# Patient Record
Sex: FEMALE | Race: WHITE | NOT HISPANIC OR LATINO | Employment: OTHER | URBAN - METROPOLITAN AREA
[De-identification: names, ages, dates, MRNs, and addresses within clinical notes are randomized per-mention and may not be internally consistent; named-entity substitution may affect disease eponyms.]

---

## 2018-03-30 LAB
25(OH)D3 SERPL-MCNC: 48.15 NG/ML
ALBUMIN SERPL BCP-MCNC: 4 G/DL (ref 3.5–5.7)
ALP SERPL-CCNC: 45 IU/L (ref 55–165)
ALT SERPL W P-5'-P-CCNC: 23 IU/L (ref 10–30)
ANION GAP SERPL CALCULATED.3IONS-SCNC: 10.2 MM/L
AST SERPL W P-5'-P-CCNC: 22 U/L (ref 7–26)
BASOPHILS # BLD AUTO: 0 X3/UL (ref 0–0.3)
BASOPHILS # BLD AUTO: 0.6 % (ref 0–2)
BILIRUB SERPL-MCNC: 0.7 MG/DL (ref 0.3–1)
BUN SERPL-MCNC: 8 MG/DL (ref 7–25)
CALCIUM SERPL-MCNC: 9 MG/DL (ref 8.6–10.5)
CHLORIDE SERPL-SCNC: 106 MM/L (ref 98–107)
CHOLEST SERPL-MCNC: 224 MG/DL (ref 0–200)
CO2 SERPL-SCNC: 29 MM/L (ref 21–31)
CREAT SERPL-MCNC: 0.81 MG/DL (ref 0.6–1.2)
DEPRECATED RDW RBC AUTO: 14 % (ref 11.5–14.5)
EGFR (HISTORICAL): > 60 GFR
EGFR AFRICAN AMERICAN (HISTORICAL): > 60 GFR
EOSINOPHIL # BLD AUTO: 0.1 X3/UL (ref 0–0.5)
EOSINOPHIL NFR BLD AUTO: 2.7 % (ref 0–5)
GLUCOSE (HISTORICAL): 97 MG/DL (ref 65–99)
HCT VFR BLD AUTO: 40.5 % (ref 37–47)
HDLC SERPL-MCNC: 69 MG/DL (ref 40–60)
HGB BLD-MCNC: 13.7 G/DL (ref 12–16)
LDLC SERPL CALC-MCNC: 141.2 MG/DL (ref 75–193)
LYMPHOCYTES # BLD AUTO: 1.7 X3/UL (ref 1.2–4.2)
LYMPHOCYTES NFR BLD AUTO: 36 % (ref 20.5–51.1)
MCH RBC QN AUTO: 29.5 PG (ref 26–34)
MCHC RBC AUTO-ENTMCNC: 33.7 G/DL (ref 31–36)
MCV RBC AUTO: 87.5 FL (ref 81–99)
MONOCYTES # BLD AUTO: 0.3 X3/UL (ref 0–1)
MONOCYTES NFR BLD AUTO: 7.5 % (ref 1.7–12)
NEUTROPHILS # BLD AUTO: 2.4 X3/UL (ref 1.4–6.5)
NEUTS SEG NFR BLD AUTO: 53.2 % (ref 42.2–75.2)
OSMOLALITY, SERUM (HISTORICAL): 280 MOSM (ref 262–291)
PLATELET # BLD AUTO: 234 X3/UL (ref 130–400)
PMV BLD AUTO: 8.2 FL (ref 8.6–11.7)
POTASSIUM SERPL-SCNC: 4.2 MM/L (ref 3.5–5.5)
RBC # BLD AUTO: 4.64 X6/UL (ref 3.9–5.2)
SODIUM SERPL-SCNC: 141 MM/L (ref 134–143)
T4 FREE SERPL-MCNC: 0.8 NG/DL (ref 0.6–1.7)
TOTAL PROTEIN (HISTORICAL): 6.5 G/DL (ref 6.4–8.9)
TRIGL SERPL-MCNC: 70 MG/DL (ref 44–166)
TSH SERPL DL<=0.05 MIU/L-ACNC: 2.23 UIU/M (ref 0.45–5.33)
VLDL CHOLESTEROL (HISTORICAL): 14 MG/DL (ref 5–51)
WBC # BLD AUTO: 4.6 X3/UL (ref 4.8–10.8)

## 2019-02-28 ENCOUNTER — APPOINTMENT (OUTPATIENT)
Dept: LAB | Facility: CLINIC | Age: 69
End: 2019-02-28

## 2019-02-28 ENCOUNTER — TRANSCRIBE ORDERS (OUTPATIENT)
Dept: LAB | Facility: CLINIC | Age: 69
End: 2019-02-28

## 2019-02-28 DIAGNOSIS — Z01.84 IMMUNITY STATUS TESTING: Primary | ICD-10-CM

## 2019-02-28 DIAGNOSIS — Z11.1 SCREENING EXAMINATION FOR PULMONARY TUBERCULOSIS: ICD-10-CM

## 2019-02-28 DIAGNOSIS — Z01.84 IMMUNITY STATUS TESTING: ICD-10-CM

## 2019-02-28 LAB — RUBV IGG SERPL IA-ACNC: >175 IU/ML

## 2019-02-28 PROCEDURE — 36415 COLL VENOUS BLD VENIPUNCTURE: CPT

## 2019-02-28 PROCEDURE — 86762 RUBELLA ANTIBODY: CPT

## 2019-02-28 PROCEDURE — 86735 MUMPS ANTIBODY: CPT

## 2019-02-28 PROCEDURE — 86480 TB TEST CELL IMMUN MEASURE: CPT

## 2019-02-28 PROCEDURE — 86787 VARICELLA-ZOSTER ANTIBODY: CPT

## 2019-02-28 PROCEDURE — 86765 RUBEOLA ANTIBODY: CPT

## 2019-03-04 LAB
GAMMA INTERFERON BACKGROUND BLD IA-ACNC: 0.02 IU/ML
M TB IFN-G BLD-IMP: NEGATIVE
M TB IFN-G CD4+ BCKGRND COR BLD-ACNC: 0 IU/ML
M TB IFN-G CD4+ BCKGRND COR BLD-ACNC: 0.01 IU/ML
MITOGEN IGNF BCKGRD COR BLD-ACNC: >10 IU/ML

## 2019-03-05 LAB
MEV IGG SER QL: NORMAL
MUV IGG SER QL: NORMAL
VZV IGG SER IA-ACNC: NORMAL

## 2020-10-03 ENCOUNTER — APPOINTMENT (OUTPATIENT)
Dept: LAB | Facility: HOSPITAL | Age: 70
End: 2020-10-03
Payer: MEDICARE

## 2020-10-03 ENCOUNTER — TRANSCRIBE ORDERS (OUTPATIENT)
Dept: LAB | Facility: HOSPITAL | Age: 70
End: 2020-10-03

## 2020-10-03 DIAGNOSIS — E55.9 VITAMIN D DEFICIENCY: ICD-10-CM

## 2020-10-03 DIAGNOSIS — E78.5 HYPERLIPIDEMIA, UNSPECIFIED HYPERLIPIDEMIA TYPE: Primary | ICD-10-CM

## 2020-10-03 DIAGNOSIS — E78.5 HYPERLIPIDEMIA, UNSPECIFIED HYPERLIPIDEMIA TYPE: ICD-10-CM

## 2020-10-03 LAB
25(OH)D3 SERPL-MCNC: 52.4 NG/ML (ref 30–100)
ALBUMIN SERPL BCP-MCNC: 4.1 G/DL (ref 3.5–5.7)
ALP SERPL-CCNC: 49 U/L (ref 55–165)
ALT SERPL W P-5'-P-CCNC: 17 U/L (ref 7–52)
ANION GAP SERPL CALCULATED.3IONS-SCNC: 6 MMOL/L (ref 4–13)
AST SERPL W P-5'-P-CCNC: 16 U/L (ref 13–39)
BILIRUB SERPL-MCNC: 0.6 MG/DL (ref 0.2–1)
BUN SERPL-MCNC: 20 MG/DL (ref 7–25)
CALCIUM SERPL-MCNC: 9.2 MG/DL (ref 8.6–10.5)
CHLORIDE SERPL-SCNC: 103 MMOL/L (ref 98–107)
CHOLEST SERPL-MCNC: 193 MG/DL (ref 0–200)
CO2 SERPL-SCNC: 30 MMOL/L (ref 21–31)
CREAT SERPL-MCNC: 0.82 MG/DL (ref 0.6–1.2)
GFR SERPL CREATININE-BSD FRML MDRD: 74 ML/MIN/1.73SQ M
GLUCOSE P FAST SERPL-MCNC: 93 MG/DL (ref 65–99)
HDLC SERPL-MCNC: 56 MG/DL
LDLC SERPL CALC-MCNC: 123 MG/DL (ref 0–100)
NONHDLC SERPL-MCNC: 137 MG/DL
POTASSIUM SERPL-SCNC: 3.9 MMOL/L (ref 3.5–5.5)
PROT SERPL-MCNC: 6.8 G/DL (ref 6.4–8.9)
SODIUM SERPL-SCNC: 139 MMOL/L (ref 134–143)
TRIGL SERPL-MCNC: 70 MG/DL (ref 44–166)

## 2020-10-03 PROCEDURE — 80053 COMPREHEN METABOLIC PANEL: CPT

## 2020-10-03 PROCEDURE — 36415 COLL VENOUS BLD VENIPUNCTURE: CPT

## 2020-10-03 PROCEDURE — 80061 LIPID PANEL: CPT

## 2020-10-03 PROCEDURE — 82306 VITAMIN D 25 HYDROXY: CPT

## 2022-02-09 PROCEDURE — 99284 EMERGENCY DEPT VISIT MOD MDM: CPT

## 2022-02-10 ENCOUNTER — HOSPITAL ENCOUNTER (EMERGENCY)
Facility: HOSPITAL | Age: 72
Discharge: HOME | End: 2022-02-10
Attending: EMERGENCY MEDICINE | Admitting: EMERGENCY MEDICINE
Payer: MEDICARE

## 2022-02-10 ENCOUNTER — APPOINTMENT (EMERGENCY)
Dept: RADIOLOGY | Facility: HOSPITAL | Age: 72
End: 2022-02-10
Payer: MEDICARE

## 2022-02-10 VITALS
HEART RATE: 62 BPM | TEMPERATURE: 97.5 F | RESPIRATION RATE: 18 BRPM | OXYGEN SATURATION: 94 % | SYSTOLIC BLOOD PRESSURE: 137 MMHG | DIASTOLIC BLOOD PRESSURE: 63 MMHG | WEIGHT: 168 LBS | BODY MASS INDEX: 29.76 KG/M2

## 2022-02-10 DIAGNOSIS — R11.2 NAUSEA AND VOMITING: Primary | ICD-10-CM

## 2022-02-10 LAB
2HR DELTA HS TROPONIN: -2 NG/L
ALBUMIN SERPL BCP-MCNC: 4.2 G/DL (ref 3.5–5)
ALP SERPL-CCNC: 72 U/L (ref 34–104)
ALT SERPL W P-5'-P-CCNC: 25 U/L (ref 7–52)
ANION GAP SERPL CALCULATED.3IONS-SCNC: 9 MMOL/L (ref 4–13)
AST SERPL W P-5'-P-CCNC: 17 U/L (ref 13–39)
ATRIAL RATE: 56 BPM
BASOPHILS # BLD AUTO: 0.03 THOUSANDS/ΜL (ref 0–0.1)
BASOPHILS NFR BLD AUTO: 1 % (ref 0–1)
BILIRUB SERPL-MCNC: 0.48 MG/DL (ref 0.2–1)
BUN SERPL-MCNC: 14 MG/DL (ref 5–25)
CALCIUM SERPL-MCNC: 9.2 MG/DL (ref 8.4–10.2)
CARDIAC TROPONIN I PNL SERPL HS: 10 NG/L
CARDIAC TROPONIN I PNL SERPL HS: 8 NG/L
CHLORIDE SERPL-SCNC: 99 MMOL/L (ref 96–108)
CO2 SERPL-SCNC: 25 MMOL/L (ref 21–32)
CREAT SERPL-MCNC: 0.78 MG/DL (ref 0.6–1.3)
EOSINOPHIL # BLD AUTO: 0.04 THOUSAND/ΜL (ref 0–0.61)
EOSINOPHIL NFR BLD AUTO: 1 % (ref 0–6)
ERYTHROCYTE [DISTWIDTH] IN BLOOD BY AUTOMATED COUNT: 13.3 % (ref 11.6–15.1)
GFR SERPL CREATININE-BSD FRML MDRD: 77 ML/MIN/1.73SQ M
GLUCOSE SERPL-MCNC: 113 MG/DL (ref 65–140)
HCT VFR BLD AUTO: 41.5 % (ref 34.8–46.1)
HGB BLD-MCNC: 13.6 G/DL (ref 11.5–15.4)
IMM GRANULOCYTES # BLD AUTO: 0.04 THOUSAND/UL (ref 0–0.2)
IMM GRANULOCYTES NFR BLD AUTO: 1 % (ref 0–2)
LIPASE SERPL-CCNC: 31 U/L (ref 11–82)
LYMPHOCYTES # BLD AUTO: 1.1 THOUSANDS/ΜL (ref 0.6–4.47)
LYMPHOCYTES NFR BLD AUTO: 19 % (ref 14–44)
MCH RBC QN AUTO: 28.8 PG (ref 26.8–34.3)
MCHC RBC AUTO-ENTMCNC: 32.8 G/DL (ref 31.4–37.4)
MCV RBC AUTO: 88 FL (ref 82–98)
MONOCYTES # BLD AUTO: 0.43 THOUSAND/ΜL (ref 0.17–1.22)
MONOCYTES NFR BLD AUTO: 8 % (ref 4–12)
NEUTROPHILS # BLD AUTO: 4.13 THOUSANDS/ΜL (ref 1.85–7.62)
NEUTS SEG NFR BLD AUTO: 70 % (ref 43–75)
NRBC BLD AUTO-RTO: 0 /100 WBCS
P AXIS: 29 DEGREES
PLATELET # BLD AUTO: 215 THOUSANDS/UL (ref 149–390)
PMV BLD AUTO: 9.5 FL (ref 8.9–12.7)
POTASSIUM SERPL-SCNC: 4 MMOL/L (ref 3.5–5.3)
PR INTERVAL: 142 MS
PROT SERPL-MCNC: 7 G/DL (ref 6.4–8.4)
QRS AXIS: 17 DEGREES
QRSD INTERVAL: 78 MS
QT INTERVAL: 436 MS
QTC INTERVAL: 420 MS
RBC # BLD AUTO: 4.73 MILLION/UL (ref 3.81–5.12)
SODIUM SERPL-SCNC: 133 MMOL/L (ref 135–147)
T WAVE AXIS: 31 DEGREES
VENTRICULAR RATE: 56 BPM
WBC # BLD AUTO: 5.77 THOUSAND/UL (ref 4.31–10.16)

## 2022-02-10 PROCEDURE — 93005 ELECTROCARDIOGRAM TRACING: CPT

## 2022-02-10 PROCEDURE — 80053 COMPREHEN METABOLIC PANEL: CPT | Performed by: EMERGENCY MEDICINE

## 2022-02-10 PROCEDURE — 85025 COMPLETE CBC W/AUTO DIFF WBC: CPT | Performed by: EMERGENCY MEDICINE

## 2022-02-10 PROCEDURE — 84484 ASSAY OF TROPONIN QUANT: CPT | Performed by: EMERGENCY MEDICINE

## 2022-02-10 PROCEDURE — 99285 EMERGENCY DEPT VISIT HI MDM: CPT | Performed by: EMERGENCY MEDICINE

## 2022-02-10 PROCEDURE — 96374 THER/PROPH/DIAG INJ IV PUSH: CPT

## 2022-02-10 PROCEDURE — 96361 HYDRATE IV INFUSION ADD-ON: CPT

## 2022-02-10 PROCEDURE — 36415 COLL VENOUS BLD VENIPUNCTURE: CPT | Performed by: EMERGENCY MEDICINE

## 2022-02-10 PROCEDURE — 83690 ASSAY OF LIPASE: CPT | Performed by: EMERGENCY MEDICINE

## 2022-02-10 PROCEDURE — 71045 X-RAY EXAM CHEST 1 VIEW: CPT

## 2022-02-10 PROCEDURE — 96375 TX/PRO/DX INJ NEW DRUG ADDON: CPT

## 2022-02-10 PROCEDURE — 93010 ELECTROCARDIOGRAM REPORT: CPT | Performed by: INTERNAL MEDICINE

## 2022-02-10 RX ORDER — PANTOPRAZOLE SODIUM 40 MG/1
40 TABLET, DELAYED RELEASE ORAL 2 TIMES DAILY
COMMUNITY

## 2022-02-10 RX ORDER — METOCLOPRAMIDE 10 MG/1
10 TABLET ORAL EVERY 6 HOURS PRN
Qty: 30 TABLET | Refills: 0 | Status: SHIPPED | OUTPATIENT
Start: 2022-02-10

## 2022-02-10 RX ORDER — ONDANSETRON 2 MG/ML
4 INJECTION INTRAMUSCULAR; INTRAVENOUS ONCE
Status: COMPLETED | OUTPATIENT
Start: 2022-02-10 | End: 2022-02-10

## 2022-02-10 RX ORDER — DIPHENHYDRAMINE HCL 25 MG
25-50 TABLET ORAL EVERY 6 HOURS PRN
Qty: 20 TABLET | Refills: 0 | Status: SHIPPED | OUTPATIENT
Start: 2022-02-10

## 2022-02-10 RX ORDER — DIPHENHYDRAMINE HYDROCHLORIDE 50 MG/ML
50 INJECTION INTRAMUSCULAR; INTRAVENOUS ONCE
Status: COMPLETED | OUTPATIENT
Start: 2022-02-10 | End: 2022-02-10

## 2022-02-10 RX ORDER — METOCLOPRAMIDE HYDROCHLORIDE 5 MG/ML
10 INJECTION INTRAMUSCULAR; INTRAVENOUS ONCE
Status: COMPLETED | OUTPATIENT
Start: 2022-02-10 | End: 2022-02-10

## 2022-02-10 RX ADMIN — DIPHENHYDRAMINE HYDROCHLORIDE 50 MG: 50 INJECTION INTRAMUSCULAR; INTRAVENOUS at 03:29

## 2022-02-10 RX ADMIN — SODIUM CHLORIDE 1000 ML: 0.9 INJECTION, SOLUTION INTRAVENOUS at 01:23

## 2022-02-10 RX ADMIN — METOCLOPRAMIDE HYDROCHLORIDE 10 MG: 5 INJECTION INTRAMUSCULAR; INTRAVENOUS at 03:31

## 2022-02-10 RX ADMIN — ONDANSETRON 4 MG: 2 INJECTION INTRAMUSCULAR; INTRAVENOUS at 01:18

## 2022-02-10 RX ADMIN — FAMOTIDINE 20 MG: 10 INJECTION INTRAVENOUS at 01:20

## 2022-02-10 RX ADMIN — SODIUM CHLORIDE 1000 ML: 0.9 INJECTION, SOLUTION INTRAVENOUS at 03:28

## 2022-02-10 NOTE — DISCHARGE INSTRUCTIONS
RETURN IF WORSE IN ANY WAY:   PAIN,   WORSENING NAUSEA/VOMITING  FEVER OR FLU LIKE SYMPTOMS,   OR NEW AND CONCERNING SYMPTOMS SIGNS OR SYMPTOMS      PLEASE CALL YOUR PRIMARY DOCTOR IN THE MORNING TO SET UP FOLLOW UP for TOMORROW  PLEASE REVIEW THE WORK UP RESULTS WITH YOUR DOCTOR  Please continue to drink plenty of fluids    He can take Zofran for nausea or vomiting        PATIENT SURVEY:   Thank you for your visit today  Your satisfaction is very very important to us  Bisi Hernandez for choosing [facility] for your ER care  If you get a survey in the mail please rate your service as VERY GOOD (other ratings lower than this are actually detrimental) - This helps our team to continue providing excellent care - Bisi Hernandez

## 2022-02-10 NOTE — ED PROCEDURE NOTE
PROCEDURE  ECG 12 Lead Documentation Only    Date/Time: 2/10/2022 2:00 AM  Performed by: Johny Hunt MD  Authorized by: Johny Hunt MD     Indications / Diagnosis:  N/v  ECG reviewed by me, the ED Provider: yes    Patient location:  ED  Previous ECG:     Comparison to cardiac monitor: Yes    Interpretation:     Interpretation: non-specific    Rate:     ECG rate:  56    ECG rate assessment: bradycardic    Rhythm:     Rhythm: sinus bradycardia    Ectopy:     Ectopy: none    QRS:     QRS axis:  Normal    QRS intervals:  Normal  Conduction:     Conduction: normal    ST segments:     ST segments:  Non-specific  T waves:     T waves: non-specific           Johny Hunt MD  02/10/22 9185

## 2022-02-10 NOTE — ED NOTES
Patient still feels a little nauseated yet even after her medicines  Dr Lucie Vanessa made aware of the same       Judi Mcintosh RN  02/10/22 6866

## 2022-02-10 NOTE — ED PROVIDER NOTES
History  Chief Complaint   Patient presents with    Vomiting       67-YEAR-OLD FEMALE    PMH:  GERD      PATIENT IS HERE FOR Nausea and vomiting    No abdominal pain     States that she had covid and last week was finally feeling better  Yesterday her symptoms started  PCP gave rx for ODT zofran      ASSOCIATED SYMPTOMS:  URINARY  SYMPTOMS: THERE IS NO DYSURIA, NO HEMATURIA, NO FREQUENCY  DENIES FEVERS, BUT DOES REPORT CHILLS    DENIES LOOSE STOOLS, NO DIARRHEA, NO BLOODY STOOLS - NOT BLACK OR BLOODY      ALLEVIATING OR EXACERBATING FACTORS:  UNCERTAIN    INTERVENTIONS:   Tried ODT zofran today        History provided by:  Patient  Vomiting  Severity:  Moderate  Progression:  Unchanged  Chronicity:  New  Recent urination:  Decreased  Relieved by:  Nothing  Worsened by:  Nothing  Ineffective treatments:  Antiemetics  Associated symptoms: cough    Associated symptoms: no abdominal pain, no arthralgias, no chills, no diarrhea, no fever, no headaches, no myalgias and no sore throat        Prior to Admission Medications   Prescriptions Last Dose Informant Patient Reported? Taking? pantoprazole (PROTONIX) 40 mg tablet   Yes Yes   Sig: Take 40 mg by mouth 2 (two) times a day      Facility-Administered Medications: None       History reviewed  No pertinent past medical history  History reviewed  No pertinent surgical history  History reviewed  No pertinent family history  I have reviewed and agree with the history as documented  E-Cigarette/Vaping    E-Cigarette Use Never User      E-Cigarette/Vaping Substances     Social History     Tobacco Use    Smoking status: Never Smoker    Smokeless tobacco: Never Used   Vaping Use    Vaping Use: Never used   Substance Use Topics    Alcohol use: Not Currently    Drug use: Not Currently       Review of Systems   Constitutional: Negative for chills, diaphoresis, fatigue and fever     HENT: Negative for congestion, dental problem, drooling, ear discharge, ear pain, rhinorrhea, sinus pressure, sinus pain, sneezing, sore throat, tinnitus, trouble swallowing and voice change  Eyes: Negative for photophobia, pain, discharge, redness, itching and visual disturbance  Respiratory: Positive for cough  Negative for shortness of breath, wheezing and stridor  Cardiovascular: Negative for chest pain, palpitations and leg swelling  Gastrointestinal: Positive for nausea and vomiting  Negative for abdominal pain, anal bleeding, blood in stool, constipation and diarrhea  Genitourinary: Negative for difficulty urinating, dysuria, flank pain and frequency  Musculoskeletal: Negative for arthralgias, back pain, gait problem, joint swelling, myalgias, neck pain and neck stiffness  Skin: Negative for rash and wound  Neurological: Negative for dizziness, light-headedness and headaches  All other systems reviewed and are negative  Physical Exam  Physical Exam  Constitutional:       General: She is not in acute distress  Appearance: She is well-developed  She is not ill-appearing, toxic-appearing or diaphoretic  HENT:      Head: Normocephalic and atraumatic  Right Ear: External ear normal       Left Ear: External ear normal       Nose: Nose normal       Mouth/Throat:      Pharynx: No oropharyngeal exudate or posterior oropharyngeal erythema  Eyes:      General: No scleral icterus  Right eye: No discharge  Left eye: No discharge  Extraocular Movements: Extraocular movements intact  Conjunctiva/sclera: Conjunctivae normal       Pupils: Pupils are equal, round, and reactive to light  Neck:      Vascular: No JVD  Trachea: No tracheal deviation  Cardiovascular:      Rate and Rhythm: Normal rate and regular rhythm  Pulses: Normal pulses  Heart sounds: Normal heart sounds  No murmur heard  No friction rub  No gallop  Pulmonary:      Effort: Pulmonary effort is normal  No respiratory distress        Breath sounds: Normal breath sounds  No stridor  No wheezing, rhonchi or rales  Chest:      Chest wall: No tenderness  Abdominal:      General: Bowel sounds are normal  There is no distension  Palpations: Abdomen is soft  There is no mass  Tenderness: There is no abdominal tenderness  There is no right CVA tenderness, left CVA tenderness, guarding or rebound  Hernia: No hernia is present  Musculoskeletal:         General: No swelling, tenderness, deformity or signs of injury  Normal range of motion  Cervical back: Normal range of motion and neck supple  No rigidity or tenderness  Right lower leg: No edema  Left lower leg: No edema  Lymphadenopathy:      Cervical: No cervical adenopathy  Skin:     General: Skin is warm  Capillary Refill: Capillary refill takes less than 2 seconds  Coloration: Skin is not jaundiced or pale  Findings: No bruising, erythema, lesion or rash  Neurological:      General: No focal deficit present  Mental Status: She is alert and oriented to person, place, and time  Mental status is at baseline  Cranial Nerves: No cranial nerve deficit  Sensory: No sensory deficit  Motor: No weakness or abnormal muscle tone  Coordination: Coordination normal    Psychiatric:         Mood and Affect: Mood normal          Behavior: Behavior normal          Thought Content:  Thought content normal          Judgment: Judgment normal          Vital Signs  ED Triage Vitals [02/10/22 0003]   Temperature Pulse Respirations Blood Pressure SpO2   97 5 °F (36 4 °C) 65 18 170/92 96 %      Temp Source Heart Rate Source Patient Position - Orthostatic VS BP Location FiO2 (%)   Temporal Monitor Sitting Left arm --      Pain Score       No Pain           Vitals:    02/10/22 0300 02/10/22 0330 02/10/22 0400 02/10/22 0430   BP: 169/74 166/75 136/64 137/63   Pulse: 56 58 63 62   Patient Position - Orthostatic VS:             Visual Acuity      ED Medications  Medications sodium chloride 0 9 % bolus 1,000 mL (0 mL Intravenous Stopped 2/10/22 0255)   famotidine (PEPCID) injection 20 mg (20 mg Intravenous Given 2/10/22 0120)   ondansetron (ZOFRAN) injection 4 mg (4 mg Intravenous Given 2/10/22 0118)   metoclopramide (REGLAN) injection 10 mg (10 mg Intravenous Given 2/10/22 0331)   diphenhydrAMINE (BENADRYL) injection 50 mg (50 mg Intravenous Given 2/10/22 0329)   sodium chloride 0 9 % bolus 1,000 mL (0 mL Intravenous Stopped 2/10/22 0511)       Diagnostic Studies  Results Reviewed     Procedure Component Value Units Date/Time    HS Troponin I 4hr [[id number]]     Lab Status: No result Specimen: Blood     HS Troponin I 2hr [[id number]]  (Normal) Collected: 02/10/22 0255    Lab Status: Final result Specimen: Blood from Arm, Left Updated: 02/10/22 0322     hs TnI 2hr 8 ng/L      Delta 2hr hsTnI -2 ng/L     HS Troponin 0hr (reflex protocol) [[id number]]  (Normal) Collected: 02/10/22 0125    Lab Status: Final result Specimen: Blood from Arm, Left Updated: 02/10/22 0153     hs TnI 0hr 10 ng/L     CMP [[id number]]  (Abnormal) Collected: 02/10/22 0018    Lab Status: Final result Specimen: Blood from Arm, Left Updated: 02/10/22 0040     Sodium 133 mmol/L      Potassium 4 0 mmol/L      Chloride 99 mmol/L      CO2 25 mmol/L      ANION GAP 9 mmol/L      BUN 14 mg/dL      Creatinine 0 78 mg/dL      Glucose 113 mg/dL      Calcium 9 2 mg/dL      AST 17 U/L      ALT 25 U/L      Alkaline Phosphatase 72 U/L      Total Protein 7 0 g/dL      Albumin 4 2 g/dL      Total Bilirubin 0 48 mg/dL      eGFR 77 ml/min/1 73sq m     Narrative:      [place] guidelines for Chronic Kidney Disease (CKD):     Stage 1 with normal or high GFR (GFR > 90 mL/min/1 73 square meters)    Stage 2 Mild CKD (GFR = 60-89 mL/min/1 73 square meters)    Stage 3A Moderate CKD (GFR = 45-59 mL/min/1 73 square meters)    Stage 3B Moderate CKD (GFR = 30-44 mL/min/1 73 square meters)    Stage 4 Severe CKD (GFR = 15-29 mL/min/1 73 square meters)    Stage 5 End Stage CKD (GFR <15 mL/min/1 73 square meters)  Note: GFR calculation is accurate only with a steady state creatinine    Lipase [[id number]]  (Normal) Collected: 02/10/22 0018    Lab Status: Final result Specimen: Blood from Arm, Left Updated: 02/10/22 0040     Lipase 31 u/L     CBC and differential [[id number]] Collected: 02/10/22 0018    Lab Status: Final result Specimen: Blood from Arm, Left Updated: 02/10/22 0024     WBC 5 77 Thousand/uL      RBC 4 73 Million/uL      Hemoglobin 13 6 g/dL      Hematocrit 41 5 %      MCV 88 fL      MCH 28 8 pg      MCHC 32 8 g/dL      RDW 13 3 %      MPV 9 5 fL      Platelets 546 Thousands/uL      nRBC 0 /100 WBCs      Neutrophils Relative 70 %      Immat GRANS % 1 %      Lymphocytes Relative 19 %      Monocytes Relative 8 %      Eosinophils Relative 1 %      Basophils Relative 1 %      Neutrophils Absolute 4 13 Thousands/µL      Immature Grans Absolute 0 04 Thousand/uL      Lymphocytes Absolute 1 10 Thousands/µL      Monocytes Absolute 0 43 Thousand/µL      Eosinophils Absolute 0 04 Thousand/µL      Basophils Absolute 0 03 Thousands/µL                  XR chest 1 view portable   ED Interpretation by Abad Medina MD (02/10 0218)     EXAM PERFORMED/VIEWS:  XR CHEST Portable        FINDINGS:     Cardiomediastinal silhouette appears unremarkable      The lungs are clear    No pneumothorax or pleural effusion      Visualized osseous structures appear unremarkable for the patient's age      IMPRESSION:     No focal consolidation, pleural effusion, or pneumothorax                             Procedures  Procedures         ED Course  ED Course as of 02/10/22 0538   u Feb 10, 2022   0101 Lipase: 31   0101 CMP(!)   0101 CBC and differential   0112 Pt seen and evaluated    Sent by PCP for ongonig n/v x 2 days  PCP called in zofran odt, but not helping  Pt has no overt cardiac symptoms, but will send troponin and do EKG to screen for cardiac causes of her symptoms  Also, pt states her PCP though that CXR would be a good idea  Will order this for pt and to assist w/ PCP further workup and management  Pt states that her Cough is stable, she has no cp or sob or pleurisy    0218 hs TnI 0hr: 10   0218 Lipase: 31   0318 Pt stable appearing   Wants something more for nausea  Wants another bag IVF    Pt is stable  She understands results of work up at this point, very reassuring    0452 Pt feels much much better    She has no signs of sepsis, no signs of serious infection or life or limb threatening process    I offered further tx, I offered admission, if she felt ill, or her n/v was too great, but she declined  She is ready to manage from home    She is very appreciative of her ED care and is ready to manage from home    She understands return precautions    She will f/u w/ PCP tomorrow                                 SBIRT 20yo+      Most Recent Value   SBIRT (22 yo +)    In order to provide better care to our patients, we are screening all of our patients for alcohol and drug use  Would it be okay to ask you these screening questions? Yes Filed at: 02/10/2022 9525   Initial Alcohol Screen: US AUDIT-C     1  How often do you have a drink containing alcohol? 0 Filed at: 02/10/2022 0511   2  How many drinks containing alcohol do you have on a typical day you are drinking? 0 Filed at: 02/10/2022 0511   3a  Male UNDER 65: How often do you have five or more drinks on one occasion? 0 Filed at: 02/10/2022 0511   3b  FEMALE Any Age, or MALE 65+: How often do you have 4 or more drinks on one occassion? 0 Filed at: 02/10/2022 0511   Audit-C Score 0 Filed at: 02/10/2022 6539   JACOBO: How many times in the past year have you    Used an illegal drug or used a prescription medication for non-medical reasons?  Never Filed at: 02/10/2022 4837                    MDM    Disposition  Final diagnoses:   Nausea and vomiting     Time reflects when diagnosis was documented in both MDM as applicable and the Disposition within this note     Time User Action Codes Description Comment    2/10/2022  4:26 AM Umu Acharya Add [R11 2] Nausea and vomiting       ED Disposition     ED Disposition Condition Date/Time Comment    Discharge Stable Thu Feb 10, 2022  4:42 AM Nadeen Clements discharge to home/self care  Follow-up Information     Follow up With Specialties Details Why Contact Info    Lorie Taylor MD  Call today  [phone]  [address] [place] [address]            Discharge Medication List as of 2/10/2022  4:54 AM      START taking these medications    Details   diphenhydrAMINE (BENADRYL) 25 mg tablet Take 1-2 tablets (25-50 mg total) by mouth every 6 (six) hours as needed for allergies or sleep (nausea), Starting u 2/10/2022, Normal      metoclopramide (Reglan) 10 mg tablet Take 1 tablet (10 mg total) by mouth every 6 (six) hours as needed (nausea), Starting Thu 2/10/2022, Normal         CONTINUE these medications which have NOT CHANGED    Details   pantoprazole (PROTONIX) 40 mg tablet Take 40 mg by mouth 2 (two) times a day, Historical Med             No discharge procedures on file      PDMP Review     None          ED Provider  Electronically Signed by           Shelby Almaraz MD  02/10/22 2788

## 2023-08-23 ENCOUNTER — TELEPHONE (OUTPATIENT)
Dept: BARIATRICS | Facility: CLINIC | Age: 73
End: 2023-08-23

## 2023-08-23 NOTE — TELEPHONE ENCOUNTER
Called patient and left a message to give the office a call back to schedule an appointment with the RD in [place] to start the Healthy [facility] Program. She did the Info Seminar on 8/22/23 with Yoly Lee RD.

## 2023-08-31 ENCOUNTER — OFFICE VISIT (OUTPATIENT)
Dept: BARIATRICS | Facility: CLINIC | Age: 73
End: 2023-08-31

## 2023-08-31 VITALS — WEIGHT: 176.2 LBS | HEIGHT: 62 IN | BODY MASS INDEX: 32.42 KG/M2

## 2023-08-31 DIAGNOSIS — R63.5 ABNORMAL WEIGHT GAIN: ICD-10-CM

## 2023-08-31 PROCEDURE — WMPRO12

## 2023-08-31 PROCEDURE — WMPFE WEIGHT MANAGEMENT PRO FEE EMPLOYEE

## 2023-08-31 PROCEDURE — RECHECK

## 2023-08-31 NOTE — PROGRESS NOTES
Weight Management [facility]  presented today for new start Healthy Core program. Today on Tan[place] scale she weighed 176.2#. She has not seen [facility] provider. Stated she has been working out for the past few months at [address] Family hx DM. Personal medical hx high total and LDL cholesterol levels. Stated that when she worked at home ~ 2 years ago she gained 20#. Diet recall below appears minimally-processed, but likely lacking in protein. Provided [place] with and reviewed Healthy Core manual and calorie-controlled, balanced meal plan. Follow-up appointment scheduled for 9/21/23.        Patient seen by Medical Provider in past 6 months:  yes  Requested to schedule appointment with Medical Provider: No      Anthropometric Measurements  Start Weight (#): 176.2 #  Current Weight (#): as above  TBW % Change from start weight: n/a  Ideal Body Weight (#): 107.5# (48.9 kg)  Goal Weight (#): to lose 30#  Highest: ~185#  Lowest:125#    Weight Loss History  Previous weight loss attempts: intermittent fasting, exercise, [employer] and Weight Watchers ~30 years ago     Food and Nutrition Related History  Wake up: 6-6:30 A  Bed Time: 10-10:30 AM    Food Recall   Breakfast:8-9 AM- whole grain cereal, 2% milk and berries OR scrambled eggs with gay and fruit; rotates between tea or coffee   Snack: none  Lunch: 2-3 PM- organic grass-fed beef/chicken/fish/seafood, fresh veggies,   Snack: none except water or decaf green tea  Dinner: 6-7 PM- skips or has michelle chips (occ with hummus) or popcorn or guac and chips (rare)   Snack:none      Beverages: tea or coffee- 16, water- 48 oz  Volume of beverage intake: 64 oz    Weekends: same  Cravings: chocolate   Trouble area of day: after lunch (craves sweet) and in the evening    Frequency of Eating out:rarely  Food restrictions: none  Cooking: self   Food Shopping: self and     Physical Activity Intake  Activity: 3 mornings per week goes to gym- 10-15 minute w/u on seated stair stepper, machine circuit, core, kettle bell, weighted ball, 10 minutes on stair stepper; takes ~1 hours; on off days, walks 45 minutes   Frequency: as above   Physical limitations/barriers to exercise: none noted     Estimated Needs  Energy  Tanita: BMR: 1402      X 1.3 -1000 = [address] Energy Needs: BMR: 4566  1-2# loss weekly sedentary: 517-1017            1-2# loss weekly lightly active: 739-1239  Maintenance calories for sedentary activity level: 1517  Protein:~60-75     (1.2-1.5g/kg IBW)  Fluid: >=57 oz     (35mL/kg IBW)    Nutrition Diagnosis  Yes; Overweight/obesity  related to Excess energy intake as evidenced by  BMI more than normative standard for age and sex (obesity-grade I 32-30. 9)       Nutrition Intervention    Nutrition Prescription  Calories:4190-6384, flex to 0510-0303 with exercise  Protein: 60-75 g   Fluid: >=64 oz    Meal Plan (David/Pro)  Breakfast:300/20  Snack:150/5-10  Lunch:300/30  Snack:150/5-10  Dinner:150/20  Snack:150/5-10      Nutrition Education:    Healthy Core Manual  Calorie controlled menu  Lean protein food choices  Healthy snack options  Food journaling tips      Nutrition Counseling:  Strategies: meal planning, portion sizes, healthy snack choices, hydration, fiber intake, protein intake, exercise, food journal      Monitoring and Evaluation:  Evaluation criteria:  Energy Intake  Meet protein needs  Maintain adequate hydration  Monitor weekly weight  Meal planning/preparation  Food journal   Decreased portions at mealtimes and snacks  Physical activity     Barriers to learning:none  Readiness to change: Action:  (Changing behavior)  Comprehension: very good  Expected Compliance: very good

## 2023-09-07 ENCOUNTER — CLINICAL SUPPORT (OUTPATIENT)
Dept: BARIATRICS | Facility: CLINIC | Age: 73
End: 2023-09-07

## 2023-09-07 DIAGNOSIS — R63.5 ABNORMAL WEIGHT GAIN: Primary | ICD-10-CM

## 2023-09-07 PROCEDURE — RECHECK

## 2023-09-08 VITALS — WEIGHT: 175.2 LBS | BODY MASS INDEX: 32.24 KG/M2 | HEIGHT: 62 IN

## 2023-09-14 ENCOUNTER — CLINICAL SUPPORT (OUTPATIENT)
Dept: BARIATRICS | Facility: CLINIC | Age: 73
End: 2023-09-14

## 2023-09-14 VITALS — HEIGHT: 62 IN | WEIGHT: 172.8 LBS | BODY MASS INDEX: 31.8 KG/M2

## 2023-09-14 DIAGNOSIS — R63.5 ABNORMAL WEIGHT GAIN: Primary | ICD-10-CM

## 2023-09-14 PROCEDURE — RECHECK

## 2023-09-21 ENCOUNTER — CLINICAL SUPPORT (OUTPATIENT)
Dept: BARIATRICS | Facility: CLINIC | Age: 73
End: 2023-09-21

## 2023-09-21 ENCOUNTER — OFFICE VISIT (OUTPATIENT)
Dept: BARIATRICS | Facility: CLINIC | Age: 73
End: 2023-09-21

## 2023-09-21 VITALS — BODY MASS INDEX: 31.43 KG/M2 | WEIGHT: 170.8 LBS | HEIGHT: 62 IN

## 2023-09-21 VITALS — WEIGHT: 170.8 LBS | BODY MASS INDEX: 31.43 KG/M2 | HEIGHT: 62 IN

## 2023-09-21 DIAGNOSIS — R63.5 ABNORMAL WEIGHT GAIN: Primary | ICD-10-CM

## 2023-09-21 PROCEDURE — RECHECK

## 2023-09-21 NOTE — PROGRESS NOTES
Weight Management [address] [place] [address]  presented today for Healthy Core 2-week follow-up session. Today at class she weighed 170.8# which reflects a loss of 5.4# since program start. [place] noted that she made some "tweaks" to her diet since joining the [employer]  program such as switching to 0% (from 2%) Fage yogurt and 1% (from 2%) milk. Reported she is food-logging and being mindful. Stated she is practicing portion control. Trying to make sure she is getting enough protein. Eating tuna. Feels she is consuming enough food. Endorsed having enough energy for her workouts in the morning. She noted she is happy she joined the program. Her goals are to continue her current routine and to keep her guard up when facing situations with the potential to interfere with her new routine.        Patient seen by Medical Provider in past 6 months:  yes  Requested to schedule appointment with Medical Provider: No      Anthropometric Measurements  Start Weight (#): 176.2 #  Current Weight (#): 170.8#  TBW % Change from start weight: 3.1%  Ideal Body Weight (#): 107.5# (48.9 kg)  Goal Weight (#): to lose 30#  Highest: ~185#  Lowest:125#    Weight Loss History  Previous weight loss attempts: intermittent fasting, exercise, [employer] and Weight Watchers ~30 years ago     Food and Nutrition Related History  Wake up: 6-6:30 A  Bed Time: 10-10:30 AM    Food Recall   Breakfast:8:30-9 AM- whole grain Eng muffin with egg and cheese OR 1/2 Eng muffin with egg/salsa, onion/peppers, tiny bit of cheese OR one slice Sánchez[employer] Killer bread with PB OR oatmeal with walnuts/berries OR 1/2 grated apple with yogurt, a few almonds, and drizzle honey; alternates  between tea or coffee   Snack: not often, but may have yogurt or Good Culture cottage cheese  Lunch: 2-3 PM- tuna, salad, mini Triscuits  Snack: none except water or decaf green tea  Dinner: 5-6 PM- fish/chicken/grass-fed beef, veggies, starch  Snack: none       Beverages: tea or coffee- 16, water- 48 oz  Volume of beverage intake: 64 oz    Weekends: same  Cravings: chocolate   Trouble area of day: after lunch (craves sweet) and in the evening    Frequency of Eating out:rarely  Food restrictions: none  Cooking: self   Food Shopping: self and     Physical Activity Intake  Activity: working out usually 7:30-8:30 AM: 3 mornings per week goes to gym- 10-15 minute w/u on seated stair stepper, machine circuit, core, kettle bell, weighted ball, 10 minutes on stair stepper; takes ~1 hour; on off days, walks 45 minutes; also going to PT  Frequency: as above   Physical limitations/barriers to exercise: none noted     Estimated Needs  Energy  [place]: BMR: 1402      X 1.3 -1000 = [address] Energy Needs: BMR: 1240  1-2# loss weekly sedentary: 488-988           1-2# loss weekly lightly active: 705-1205  Maintenance calories for sedentary activity level: 1488  Protein:~60-75     (1.2-1.5g/kg IBW)  Fluid: >=57 oz     (35mL/kg IBW)    Nutrition Diagnosis  Yes; Overweight/obesity  related to Excess energy intake as evidenced by  BMI more than normative standard for age and sex (obesity-grade I 32-30. 9)       Nutrition Intervention    Nutrition Prescription  Calories:9480-2170, flex to 4221-1442 with exercise  Protein: 60-75 g   Fluid: >=64 oz    Meal Plan (David/Pro)  Breakfast:300/20  Snack:150/5-10  Lunch:300/30  Snack:150/5-10  Dinner:150/20  Snack:150/5-10      Nutrition Education:    Healthy Core Manual  Calorie controlled menu  Lean protein food choices  Healthy snack options  Food journaling tips      Nutrition Counseling:  Strategies: meal planning, portion sizes, healthy snack choices, hydration, fiber intake, protein intake, exercise, food journal      Monitoring and Evaluation:  Evaluation criteria:  Energy Intake  Meet protein needs  Maintain adequate hydration  Monitor weekly weight  Meal planning/preparation  Food journal   Decreased portions at mealtimes and snacks  Physical activity     Barriers to learning:none  Readiness to change: Action:  (Changing behavior)  Comprehension: very good  Expected Compliance: very good

## 2023-10-05 ENCOUNTER — CLINICAL SUPPORT (OUTPATIENT)
Dept: BARIATRICS | Facility: CLINIC | Age: 73
End: 2023-10-05

## 2023-10-05 VITALS — HEIGHT: 62 IN | WEIGHT: 170.8 LBS | BODY MASS INDEX: 31.43 KG/M2

## 2023-10-05 DIAGNOSIS — R63.5 ABNORMAL WEIGHT GAIN: Primary | ICD-10-CM

## 2023-10-05 PROCEDURE — RECHECK

## 2023-10-19 ENCOUNTER — CLINICAL SUPPORT (OUTPATIENT)
Dept: BARIATRICS | Facility: CLINIC | Age: 73
End: 2023-10-19

## 2023-10-19 VITALS — BODY MASS INDEX: 31.21 KG/M2 | HEIGHT: 62 IN | WEIGHT: 169.6 LBS

## 2023-10-19 DIAGNOSIS — R63.5 ABNORMAL WEIGHT GAIN: Primary | ICD-10-CM

## 2023-10-19 PROCEDURE — RECHECK

## 2023-10-26 ENCOUNTER — CLINICAL SUPPORT (OUTPATIENT)
Dept: BARIATRICS | Facility: CLINIC | Age: 73
End: 2023-10-26

## 2023-10-26 VITALS — HEIGHT: 62 IN | WEIGHT: 167.8 LBS | BODY MASS INDEX: 30.88 KG/M2

## 2023-10-26 DIAGNOSIS — R63.5 ABNORMAL WEIGHT GAIN: Primary | ICD-10-CM

## 2023-10-26 PROCEDURE — RECHECK

## 2023-11-02 ENCOUNTER — CLINICAL SUPPORT (OUTPATIENT)
Dept: BARIATRICS | Facility: CLINIC | Age: 73
End: 2023-11-02

## 2023-11-02 VITALS — BODY MASS INDEX: 30.88 KG/M2 | HEIGHT: 62 IN | WEIGHT: 167.8 LBS

## 2023-11-02 DIAGNOSIS — R63.5 ABNORMAL WEIGHT GAIN: Primary | ICD-10-CM

## 2023-11-02 PROCEDURE — RECHECK

## 2023-11-09 ENCOUNTER — CLINICAL SUPPORT (OUTPATIENT)
Dept: BARIATRICS | Facility: CLINIC | Age: 73
End: 2023-11-09

## 2023-11-09 ENCOUNTER — OFFICE VISIT (OUTPATIENT)
Dept: BARIATRICS | Facility: CLINIC | Age: 73
End: 2023-11-09

## 2023-11-09 VITALS — WEIGHT: 167.6 LBS | BODY MASS INDEX: 30.84 KG/M2 | HEIGHT: 62 IN

## 2023-11-09 VITALS — HEIGHT: 62 IN | BODY MASS INDEX: 30.84 KG/M2 | WEIGHT: 167.6 LBS

## 2023-11-09 DIAGNOSIS — R63.5 ABNORMAL WEIGHT GAIN: Primary | ICD-10-CM

## 2023-11-09 PROCEDURE — RECHECK

## 2023-11-09 NOTE — PROGRESS NOTES
Weight Management [address] [place] [address]  presented today for Healthy [facility] month 2 follow-up session. Today at class she weighed 167. 6# which reflects a loss of 8.6# since program start. She is receiving treamtents for vertigo which may have caused a recent fall; fall may also be r/t to a virus she had. Post-fall, she is experiencing some discomfort in jaw area causing tooth sensitivity. Due to vertigo, not yet back at gym. Still able to do some household activities and is using stationary bike ~25 minutes most days. Able to drive short distances. Feels her condition is getting better. Is food-logging. Thinks she is not eating as much as normal d/t dental issues noted above (is seeing dentist). Also thinks she may not be getting enough fiber lately d/t difficulty chewing. No bowel issues, however. Stated she usually finds herself consuming appropriate amounts of kcal/protein, but sometimes a little more fat and sugar. She endorsed being more mindful and feeling better overall since beginning the Healthy Core program. Her goals are to be consistent with her diet and exercise plan and to continue to food log and be mindful.        Patient seen by Medical Provider in past 6 months:  yes  Requested to schedule appointment with Medical Provider: No      Anthropometric Measurements  Start Weight (#): 176.2 #  Current Weight (#): 167.6#  TBW % Change from start weight: 4.9%  Ideal Body Weight (#): 107.5# (48.9 kg)  Goal Weight (#): to lose 30#  Highest: ~185#  Lowest:125#    Weight Loss History  Previous weight loss attempts: intermittent fasting, exercise, [employer] [place] and Weight Watchers ~30 years ago     Food and Nutrition Related History  Wake up: 6-6:30 A  Bed Time: 10-10:30 AM    Food Recall   Breakfast:8:30-9 AM- oatmeal or cream of rice with protein powder, 1% milk, mixed berries; eggs with small amount of cheese and salsa and whole grain Eng. Muffin; [place] yogurt, berries, walnuts; tea   Snack: none  Lunch: 3 PM (early main meal)- flounder, sauteed baby spinach, rice pilaf with peas/onion/lobo  Snack: none except water or decaf green tea  Dinner: 5-6 PM- cereal or protein soups ([employer]); michelle chips and tuna  Snack: none       Beverages: tea or coffee- 16, water- 48 oz  Volume of beverage intake: 64 oz    Weekends: same  Cravings: none  Trouble area of day: after lunch (craves sweet) and in the evening    Frequency of Eating out:rarely  Food restrictions: none  Cooking: self   Food Shopping: self and     Physical Activity Intake  Activity: for current routine see narrative above; however, prior to new routine, had been working out usually 7:30-8:30 AM: 3 mornings per week goes to gym- 10-15 minute w/u on seated stair stepper, machine circuit, core, kettle bell, weighted ball, 10 minutes on stair stepper; takes ~1 hour; on off days, walks 45 minutes; also going to PT  Frequency: as above   Physical limitations/barriers to exercise: vertigo     Estimated Needs  Energy  Tanita: BMR: 1402      X 1.3 -1000 = [address] Energy Needs: BMR: 1226  1-2# loss weekly sedentary: 471-971           1-2# loss weekly lightly active: 685-1185  Maintenance calories for sedentary activity level: 1471  Protein:~60-75     (1.2-1.5g/kg IBW)  Fluid: >=57 oz     (35mL/kg IBW)    Nutrition Diagnosis  Yes; Overweight/obesity  related to Excess energy intake as evidenced by  BMI more than normative standard for age and sex (obesity-grade I 32-30. 9)       Nutrition Intervention    Nutrition Prescription  Calories:9114-2514, flex to 4833-8115 with exercise  Protein: 60-75 g   Fluid: >=64 oz    Meal Plan (David/Pro)  Breakfast:300/20  Snack:150/5-10  Lunch:300/30  Snack:150/5-10  Dinner:150/20  Snack:150/5-10      Nutrition Education:    Healthy Core Manual  Calorie controlled menu  Lean protein food choices  Healthy snack options  Food journaling tips      Nutrition Counseling:  Strategies: meal planning, portion sizes, healthy snack choices, hydration, fiber intake, protein intake, exercise, food journal      Monitoring and Evaluation:  Evaluation criteria:  Energy Intake  Meet protein needs  Maintain adequate hydration  Monitor weekly weight  Meal planning/preparation  Food journal   Decreased portions at mealtimes and snacks  Physical activity     Barriers to learning:none  Readiness to change: Action:  (Changing behavior)  Comprehension: very good  Expected Compliance: very good

## 2023-11-16 ENCOUNTER — CLINICAL SUPPORT (OUTPATIENT)
Dept: BARIATRICS | Facility: CLINIC | Age: 73
End: 2023-11-16

## 2023-11-16 VITALS — WEIGHT: 166 LBS | HEIGHT: 62 IN | BODY MASS INDEX: 30.55 KG/M2

## 2023-11-16 DIAGNOSIS — R63.5 ABNORMAL WEIGHT GAIN: Primary | ICD-10-CM

## 2023-11-16 PROCEDURE — RECHECK

## 2023-11-21 ENCOUNTER — CLINICAL SUPPORT (OUTPATIENT)
Dept: BARIATRICS | Facility: CLINIC | Age: 73
End: 2023-11-21

## 2023-11-21 VITALS — WEIGHT: 167.4 LBS | HEIGHT: 62 IN | BODY MASS INDEX: 30.8 KG/M2

## 2023-11-21 DIAGNOSIS — R63.5 ABNORMAL WEIGHT GAIN: Primary | ICD-10-CM

## 2023-11-21 PROCEDURE — RECHECK

## 2023-11-30 ENCOUNTER — CLINICAL SUPPORT (OUTPATIENT)
Dept: BARIATRICS | Facility: CLINIC | Age: 73
End: 2023-11-30

## 2023-11-30 VITALS — HEIGHT: 62 IN | WEIGHT: 165.6 LBS | BODY MASS INDEX: 30.47 KG/M2

## 2023-11-30 DIAGNOSIS — R63.5 ABNORMAL WEIGHT GAIN: Primary | ICD-10-CM

## 2023-11-30 PROCEDURE — RECHECK

## 2023-12-07 ENCOUNTER — CLINICAL SUPPORT (OUTPATIENT)
Dept: BARIATRICS | Facility: CLINIC | Age: 73
End: 2023-12-07

## 2023-12-07 VITALS — HEIGHT: 62 IN | BODY MASS INDEX: 30.33 KG/M2 | WEIGHT: 164.8 LBS

## 2023-12-07 DIAGNOSIS — R63.5 ABNORMAL WEIGHT GAIN: Primary | ICD-10-CM

## 2023-12-07 PROCEDURE — RECHECK

## 2023-12-18 NOTE — PROGRESS NOTES
Weight Management Medical Nutrition Assessment  Virginia presented today for Healthy Core month 3 follow-up session. Today on Tanita scale she weighed 162.2# which reflects a loss of 14# (7.9%)since program start. Reviewed body composition results with [place] and provided her with copy of same. Of the weight she lost, 64% was from her fat mass which is likely d/t the fact that she has not been able to exercise as she would like d/t some medical issues. Also reviewed results of metabolism test with [place] and provided her with copy of same as well: Reevue indirect calorimeter revealed REE is 10% slower compared to the predictive normal for someone her same age, weight, height, and gender.     Reevue Indirect Calorimeter REE: 1181 kcal           Weight loss without exercise: 1484-6191 kcal          Weight loss with exercise: 0318-8921 kcal                Maintenance: 0524-8854             Virginia endorsed continuing to food-logging. Trying to stay around 2495-0237 kcal. Has been able to find appropriate items on menus when out. Her goal is to keep her kcal to ~1150 daily. Encouraged strength-training and protein intake to build muscle. Reviewed program options post Healthy Core completion. No RD follow-up scheduled at this time.       Patient seen by Medical Provider in past 6 months:  yes  Requested to schedule appointment with Medical Provider: No      Anthropometric Measurements  Start Weight (#): 176.2 #  Current Weight (#): 162.2#  TBW % Change from start weight: 7.9%  Ideal Body Weight (#): 107.5# (48.9 kg)  Goal Weight (#): to lose 30#  Highest: ~185#  Lowest:125#    Weight Loss History  Previous weight loss attempts: intermittent fasting, exercise, [facility] and Weight Watchers ~30 years ago     Food and Nutrition Related History  Wake up: 6-6:30 A  Bed Time: 10-10:30 AM    Food Recall   Breakfast:8:30-9 AM- eggs or oatmeal with protein powder or light Eng muffin with PB, blueberries, coffee  Snack:  "\"not really\"   Lunch: 3 PM (early main meal)- stir miller veggies with chicken and rice  Snack: none except water or decaf green tea  Dinner: 5-6 PM- smoothie with protein powder, 1% milk, Greek yogurt, and banana, michelle chips   Snack: none      Beverages: tea or coffee- 16, water- 48 oz  Volume of beverage intake: 64 oz    Weekends: same  Cravings: sweet after a meal  Trouble area of day: after lunch (craves sweet) and in the evening    Frequency of Eating out: occasionally  Food restrictions: none  Cooking: self   Food Shopping: self and     Physical Activity Intake  Activity: stationary bike, sometimes walking, 10# hand-weights 10#, stretching exercise from PT  Frequency: daily   Physical limitations/barriers to exercise: vertigo     Estimated Needs  Energy  [place]: BMR: 1402      X 1.3 -1000 = 823  [address] Energy Needs: BMR: 1226  1-2# loss weekly sedentary: 471-971           1-2# loss weekly lightly active: 685-1185  Maintenance calories for sedentary activity level: 1471  Protein:~60-75     (1.2-1.5g/kg IBW)  Fluid: >=57 oz     (35mL/kg IBW)    Nutrition Diagnosis  Yes;    Overweight/obesity  related to Excess energy intake as evidenced by  BMI more than normative standard for age and sex (obesity-grade I 30-34.9)       Nutrition Intervention    Nutrition Prescription  Calories:2140-8375, flex to 0950-7616 with exercise  Protein: 60-75 g   Fluid: >=64 oz    Meal Plan (David/Pro)  Breakfast:300/20  Snack:150/5-10  Lunch:300/30  Snack:150/5-10  Dinner:150/20  Snack:150/5-10      Nutrition Education:    Healthy Core Manual  Calorie controlled menu  Lean protein food choices  Healthy snack options  Food journaling tips      Nutrition Counseling:  Strategies: meal planning, portion sizes, healthy snack choices, hydration, fiber intake, protein intake, exercise, food journal      Monitoring and Evaluation:  Evaluation criteria:  Energy Intake  Meet protein needs  Maintain adequate hydration  Monitor weekly " weight  Meal planning/preparation  Food journal   Decreased portions at mealtimes and snacks  Physical activity     Barriers to learning:none  Readiness to change: Action:  (Changing behavior)  Comprehension: very good  Expected Compliance: very good

## 2023-12-19 ENCOUNTER — OFFICE VISIT (OUTPATIENT)
Dept: BARIATRICS | Facility: CLINIC | Age: 73
End: 2023-12-19

## 2023-12-19 VITALS — BODY MASS INDEX: 29.85 KG/M2 | HEIGHT: 62 IN | WEIGHT: 162.2 LBS

## 2023-12-19 DIAGNOSIS — R63.5 ABNORMAL WEIGHT GAIN: Primary | ICD-10-CM

## 2023-12-19 PROCEDURE — RECHECK

## 2024-06-06 ENCOUNTER — APPOINTMENT (OUTPATIENT)
Age: 74
End: 2024-06-06
Payer: MEDICARE

## 2024-06-06 DIAGNOSIS — Z13.6 SCREENING FOR ISCHEMIC HEART DISEASE: ICD-10-CM

## 2024-06-06 LAB
CHOLEST SERPL-MCNC: 214 MG/DL
HDLC SERPL-MCNC: 68 MG/DL
LDLC SERPL CALC-MCNC: 133 MG/DL (ref 0–100)
NONHDLC SERPL-MCNC: 146 MG/DL
TRIGL SERPL-MCNC: 67 MG/DL

## 2024-06-06 PROCEDURE — 80061 LIPID PANEL: CPT

## 2024-06-06 PROCEDURE — 36415 COLL VENOUS BLD VENIPUNCTURE: CPT
